# Patient Record
Sex: MALE | Race: WHITE
[De-identification: names, ages, dates, MRNs, and addresses within clinical notes are randomized per-mention and may not be internally consistent; named-entity substitution may affect disease eponyms.]

---

## 2021-04-19 ENCOUNTER — HOSPITAL ENCOUNTER (EMERGENCY)
Dept: HOSPITAL 76 - ED | Age: 21
Discharge: HOME | End: 2021-04-19
Payer: COMMERCIAL

## 2021-04-19 VITALS — DIASTOLIC BLOOD PRESSURE: 90 MMHG | SYSTOLIC BLOOD PRESSURE: 150 MMHG

## 2021-04-19 DIAGNOSIS — K02.9: Primary | ICD-10-CM

## 2021-04-19 PROCEDURE — 99282 EMERGENCY DEPT VISIT SF MDM: CPT

## 2021-04-19 PROCEDURE — 99283 EMERGENCY DEPT VISIT LOW MDM: CPT

## 2021-04-19 NOTE — ED PHYSICIAN DOCUMENTATION
History of Present Illness





- Stated complaint


Stated Complaint: RIGHT SIDE FACIAL PX





- Chief complaint


Chief Complaint: Heent





- History obtained from


History obtained from: Patient (20-year-old gentleman has had right-sided facial

pain from a dental source today.  He notes swelling although this is not visible

at this time.  No fevers.  Declines pain medication.  Has an appointment with 

his dentist tomorrow but was recommended to come here for antibiotics tonight.)





Review of Systems


Constitutional: reports: Reviewed and negative


Eyes: reports: Reviewed and negative


Ears: reports: Reviewed and negative


Nose: reports: Reviewed and negative





PD PAST MEDICAL HISTORY





- Past Medical History


Past Medical History: Yes


Cardiovascular: None


Respiratory: None


Neuro: None


Endocrine/Autoimmune: None


GI: None


: None


HEENT: Other


Psych: None


Musculoskeletal: None


Derm: None


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





- Past Surgical History


Past Surgical History: Yes





- Present Medications


Home Medications: 


                                Ambulatory Orders











 Medication  Instructions  Recorded  Confirmed


 


Amox/Clav 875/125 [Augmentin] 1 each PO Q12H #20 tablet 04/19/21 














- Allergies


Allergies/Adverse Reactions: 


                                    Allergies











Allergy/AdvReac Type Severity Reaction Status Date / Time


 


No Known Drug Allergies Allergy   Verified 04/19/21 18:31














- Social History


Does the pt smoke?: No


Smoking Status: Never smoker


Does the pt drink ETOH?: No


Does the pt have substance abuse?: No





- Immunizations


Immunizations are current?: Yes





PD ED PE NORMAL





- Vitals


Vital signs reviewed: Yes





- General


General: Alert and oriented X 3, No acute distress





- HEENT


HEENT: Other (Generally pretty good dentition, he does have 1 cavity from a 

right mandibular premolar, but he points to the right maxillary premolar is the 

site of pain which is mildly tender.  I do not see a cavity in that tooth, that 

said it has a Filling or sealants on it)





- Neck


Neck: Supple, no meningeal sign, No bony TTP





- Neuro


Neuro: Alert and oriented X 3, Normal speech





Results





- Vitals


Vitals: 


                               Vital Signs - 24 hr











  04/19/21 04/19/21 04/19/21





  18:31 18:44 18:56


 


Heart Rate 69 79 78


 


Respiratory 16 18 16





Rate   


 


Blood Pressure 155/100 H 159/91 H 150/90 H


 


O2 Saturation 100 99 99








                                     Oxygen











O2 Source                      Room air

















Departure





- Departure


Disposition: 01 Home, Self Care


Clinical Impression: 


 Pain due to dental caries





Condition: Good


Record reviewed to determine appropriate education?: Yes


Instructions:  ED Tooth Pain


Prescriptions: 


Amox/Clav 875/125 [Augmentin] 1 each PO Q12H #20 tablet


Comments: 


Follow-up with a dentist tomorrow scheduled.  Return for new or worsening 

symptoms.  Tylenol and/or ibuprofen as needed for pain.


Discharge Date/Time: 04/19/21 18:56